# Patient Record
Sex: MALE | Race: WHITE | NOT HISPANIC OR LATINO | URBAN - METROPOLITAN AREA
[De-identification: names, ages, dates, MRNs, and addresses within clinical notes are randomized per-mention and may not be internally consistent; named-entity substitution may affect disease eponyms.]

---

## 2020-07-25 ENCOUNTER — TELEPHONE ENCOUNTER (OUTPATIENT)
Dept: URBAN - METROPOLITAN AREA CLINIC 13 | Facility: CLINIC | Age: 55
End: 2020-07-25

## 2020-07-25 RX ORDER — OMEPRAZOLE 20 MG/1
TAKE 1 TABLET DAILY TABLET, DELAYED RELEASE ORAL
Refills: 0 | OUTPATIENT
End: 2014-06-25

## 2020-07-25 RX ORDER — METRONIDAZOLE 500 MG/1
TAKE 1 TABLET TWICE DAILY TABLET ORAL
Refills: 0 | OUTPATIENT
End: 2014-06-25

## 2020-07-25 RX ORDER — OXYCODONE AND ACETAMINOPHEN 5; 325 MG/1; MG/1
TAKE 1 TABLET EVERY 4 HOURS AS NEEDED TABLET ORAL
Refills: 0 | OUTPATIENT
End: 2014-06-25

## 2020-07-25 RX ORDER — CLARITHROMYCIN 500 MG/1
TAKE 1 TABLET TWICE DAILY TABLET, FILM COATED ORAL
Refills: 0 | OUTPATIENT
End: 2014-06-25

## 2020-07-25 RX ORDER — LANSOPRAZOLE 30 MG/1
TAKE 1 CAPSULE DAILY CAPSULE, DELAYED RELEASE ORAL
Refills: 0 | OUTPATIENT
End: 2014-06-25

## 2020-07-25 RX ORDER — FAMOTIDINE 20 MG/1
TAKE 1 TABLET DAILY AS DIRECTED TABLET ORAL
Refills: 0 | OUTPATIENT
End: 2014-06-25

## 2020-07-26 ENCOUNTER — TELEPHONE ENCOUNTER (OUTPATIENT)
Dept: URBAN - METROPOLITAN AREA CLINIC 13 | Facility: CLINIC | Age: 55
End: 2020-07-26

## 2020-07-26 RX ORDER — CETIRIZINE HYDROCHLORIDE 10 MG/1
TAKE 1 TABLET DAILY AS NEEDED TABLET, FILM COATED ORAL
Refills: 0 | Status: ACTIVE | COMMUNITY

## 2020-07-26 RX ORDER — ESOMEPRAZOLE MAGNESIUM 40 MG
TAKE 1 CAPSULE DAILY CAPSULE,DELAYED RELEASE (ENTERIC COATED) ORAL
Qty: 30 | Refills: 1 | Status: ACTIVE | COMMUNITY
Start: 2014-06-25

## 2023-05-23 ENCOUNTER — LAB OUTSIDE AN ENCOUNTER (OUTPATIENT)
Dept: URBAN - METROPOLITAN AREA CLINIC 113 | Facility: CLINIC | Age: 58
End: 2023-05-23

## 2023-05-23 ENCOUNTER — WEB ENCOUNTER (OUTPATIENT)
Dept: URBAN - METROPOLITAN AREA CLINIC 113 | Facility: CLINIC | Age: 58
End: 2023-05-23

## 2023-05-23 ENCOUNTER — OFFICE VISIT (OUTPATIENT)
Dept: URBAN - METROPOLITAN AREA CLINIC 113 | Facility: CLINIC | Age: 58
End: 2023-05-23
Payer: COMMERCIAL

## 2023-05-23 ENCOUNTER — TELEPHONE ENCOUNTER (OUTPATIENT)
Dept: URBAN - METROPOLITAN AREA CLINIC 113 | Facility: CLINIC | Age: 58
End: 2023-05-23

## 2023-05-23 VITALS
HEART RATE: 55 BPM | DIASTOLIC BLOOD PRESSURE: 83 MMHG | BODY MASS INDEX: 25.79 KG/M2 | TEMPERATURE: 97.1 F | RESPIRATION RATE: 16 BRPM | WEIGHT: 170.2 LBS | SYSTOLIC BLOOD PRESSURE: 131 MMHG | HEIGHT: 68 IN

## 2023-05-23 DIAGNOSIS — K22.70 BARRETTS ESOPHAGUS: ICD-10-CM

## 2023-05-23 DIAGNOSIS — R63.4 WEIGHT LOSS: ICD-10-CM

## 2023-05-23 DIAGNOSIS — Z12.11 COLON CANCER SCREENING: ICD-10-CM

## 2023-05-23 DIAGNOSIS — Z86.010 COLON POLYP HISTORY (TUBULAR ADENOMA): ICD-10-CM

## 2023-05-23 DIAGNOSIS — R10.13 EPIGASTRIC ABDOMINAL PAIN: ICD-10-CM

## 2023-05-23 PROCEDURE — 99203 OFFICE O/P NEW LOW 30 MIN: CPT | Performed by: INTERNAL MEDICINE

## 2023-05-23 RX ORDER — AMLODIPINE BESYLATE 10 MG/1
1 TABLET TABLET ORAL ONCE A DAY
Status: ACTIVE | COMMUNITY

## 2023-05-23 RX ORDER — CETIRIZINE HYDROCHLORIDE 10 MG/1
TAKE 1 TABLET DAILY AS NEEDED TABLET, FILM COATED ORAL
Refills: 0 | Status: ACTIVE | COMMUNITY

## 2023-05-23 RX ORDER — TADALAFIL 5 MG/1
1 TABLET AS NEEDED TABLET, FILM COATED ORAL ONCE A DAY
Status: ACTIVE | COMMUNITY

## 2023-05-23 RX ORDER — ESOMEPRAZOLE MAGNESIUM 40 MG
TAKE 1 CAPSULE DAILY CAPSULE,DELAYED RELEASE (ENTERIC COATED) ORAL
Qty: 30 | Refills: 1 | Status: ACTIVE | COMMUNITY
Start: 2014-06-25

## 2023-05-23 NOTE — HPI-TODAY'S VISIT:
57-year-old man presents with a recent history of relatively acute epigastric pain which occurred after a day of bloating.  He had a couple of margaritas and some hot wings.  Then the next morning he had nausea and vomiting with epigastric discomfort.  Associate with diarrhea.  No rectal bleeding or melena.  No fever.  He went to urgent care and then to the emergency room at [facility].  There a CT scan was done which was negative.  Labs were essentially negative.  He was treated with antiacids and his symptoms slowly resolved.  However, after this he had inability to eat.  He had early satiety.  Nausea.  No vomiting.  No dysphagia.  No heartburn symptoms.  He does have heartburn from time to time and takes Nexium as needed.  He lost about 15 to 20 pounds.  Now his appetite is improving.  He has some constipation which was self-limited.  This is resolved.  No reports of hematemesis.  No further episodes of nausea or vomiting.  He is starting to feel better at this time. . He has a history of colon polyps.  He has been getting his colonoscopy to [facility].  We will obtain these records. . He has a history of an appendectomy in 2019 by Dr. Jacques at [place]. .

## 2023-05-23 NOTE — HPI-TODAY'S VISIT:
CT scan abdomen pelvis May 14, 2023.  No acute gastrointestinal findings.  Large prostate noted.  Nonobstructing left kidney stone. . Labs May 14, 2023.  Hemoglobin 16.3.  Platelet count 270,000.  Creatinine 0.6.  AST 42, ALT 28, alk phosphatase 47.  Hepatitis C antibody negative.  Lipase 46. . Abdominal ultrasound May 14, 2023.  Gallbladder sludge noted.  No evidence for acute cholecystitis.  No stones noted.  Common bile duct 3 mm. . CT scan July 2014.  Abdomen pelvis.  No acute gastrointestinal process identified. . EGD June 2014.  Irregular Z-line.  Biopsied.  Mild gastritis.  Normal duodenum.  Biopsies revealed Mitchell's mucosa.  No evidence for Helicobacter pylori. Dr. Callejas

## 2023-06-20 ENCOUNTER — CLAIMS CREATED FROM THE CLAIM WINDOW (OUTPATIENT)
Dept: URBAN - METROPOLITAN AREA SURGERY CENTER 25 | Facility: SURGERY CENTER | Age: 58
End: 2023-06-20

## 2023-06-20 ENCOUNTER — CLAIMS CREATED FROM THE CLAIM WINDOW (OUTPATIENT)
Dept: URBAN - METROPOLITAN AREA SURGERY CENTER 25 | Facility: SURGERY CENTER | Age: 58
End: 2023-06-20
Payer: COMMERCIAL

## 2023-06-20 ENCOUNTER — CLAIMS CREATED FROM THE CLAIM WINDOW (OUTPATIENT)
Dept: URBAN - METROPOLITAN AREA CLINIC 4 | Facility: CLINIC | Age: 58
End: 2023-06-20
Payer: COMMERCIAL

## 2023-06-20 DIAGNOSIS — K21.9 ESOPHAGEAL REFLUX: ICD-10-CM

## 2023-06-20 DIAGNOSIS — K20.80 ESOPHAGITIS, LOS ANGELES GRADE A: ICD-10-CM

## 2023-06-20 DIAGNOSIS — K22.89 PATULOUS LOWER ESOPHAGEAL SPHINCTER: ICD-10-CM

## 2023-06-20 DIAGNOSIS — K21.00 ESOPHAGITIS, REFLUX: ICD-10-CM

## 2023-06-20 DIAGNOSIS — K21.9 GASTRO-ESOPHAGEAL REFLUX DISEASE WITHOUT ESOPHAGITIS: ICD-10-CM

## 2023-06-20 PROCEDURE — 00731 ANES UPR GI NDSC PX NOS: CPT | Performed by: ANESTHESIOLOGY

## 2023-06-20 PROCEDURE — 88312 SPECIAL STAINS GROUP 1: CPT | Performed by: PATHOLOGY

## 2023-06-20 PROCEDURE — 88313 SPECIAL STAINS GROUP 2: CPT | Performed by: PATHOLOGY

## 2023-06-20 PROCEDURE — 00731 ANES UPR GI NDSC PX NOS: CPT | Performed by: ANESTHESIOLOGIST ASSISTANT

## 2023-06-20 PROCEDURE — G8907 PT DOC NO EVENTS ON DISCHARG: HCPCS | Performed by: INTERNAL MEDICINE

## 2023-06-20 PROCEDURE — 88305 TISSUE EXAM BY PATHOLOGIST: CPT | Performed by: PATHOLOGY

## 2023-06-20 PROCEDURE — 00811 ANES LWR INTST NDSC NOS: CPT | Performed by: ANESTHESIOLOGIST ASSISTANT

## 2023-06-20 PROCEDURE — 43239 EGD BIOPSY SINGLE/MULTIPLE: CPT | Performed by: INTERNAL MEDICINE

## 2023-06-20 RX ORDER — ESOMEPRAZOLE MAGNESIUM 40 MG
TAKE 1 CAPSULE DAILY CAPSULE,DELAYED RELEASE (ENTERIC COATED) ORAL
Qty: 30 | Refills: 1 | Status: ACTIVE | COMMUNITY
Start: 2014-06-25

## 2023-06-20 RX ORDER — TADALAFIL 5 MG/1
1 TABLET AS NEEDED TABLET, FILM COATED ORAL ONCE A DAY
Status: ACTIVE | COMMUNITY

## 2023-06-20 RX ORDER — AMLODIPINE BESYLATE 10 MG/1
1 TABLET TABLET ORAL ONCE A DAY
Status: ACTIVE | COMMUNITY

## 2023-06-20 RX ORDER — CETIRIZINE HYDROCHLORIDE 10 MG/1
TAKE 1 TABLET DAILY AS NEEDED TABLET, FILM COATED ORAL
Refills: 0 | Status: ACTIVE | COMMUNITY

## 2023-08-17 ENCOUNTER — OFFICE VISIT (OUTPATIENT)
Dept: URBAN - METROPOLITAN AREA CLINIC 113 | Facility: CLINIC | Age: 58
End: 2023-08-17
Payer: COMMERCIAL

## 2023-08-17 ENCOUNTER — LAB OUTSIDE AN ENCOUNTER (OUTPATIENT)
Dept: URBAN - METROPOLITAN AREA CLINIC 113 | Facility: CLINIC | Age: 58
End: 2023-08-17

## 2023-08-17 VITALS
SYSTOLIC BLOOD PRESSURE: 137 MMHG | WEIGHT: 179 LBS | BODY MASS INDEX: 27.13 KG/M2 | TEMPERATURE: 96.9 F | HEIGHT: 68 IN | DIASTOLIC BLOOD PRESSURE: 92 MMHG | RESPIRATION RATE: 18 BRPM | HEART RATE: 59 BPM

## 2023-08-17 DIAGNOSIS — R19.7 DIARRHEA: ICD-10-CM

## 2023-08-17 DIAGNOSIS — Z86.010 HISTORY OF COLON POLYPS: ICD-10-CM

## 2023-08-17 DIAGNOSIS — K20.80 ESOPHAGITIS, LOS ANGELES GRADE A: ICD-10-CM

## 2023-08-17 DIAGNOSIS — R10.13 EPIGASTRIC ABDOMINAL PAIN: ICD-10-CM

## 2023-08-17 PROCEDURE — 99214 OFFICE O/P EST MOD 30 MIN: CPT | Performed by: NURSE PRACTITIONER

## 2023-08-17 RX ORDER — HYDROCHLOROTHIAZIDE 25 MG/1
1 TABLET IN THE MORNING TABLET ORAL ONCE A DAY
Status: ACTIVE | COMMUNITY

## 2023-08-17 RX ORDER — CETIRIZINE HYDROCHLORIDE 10 MG/1
TAKE 1 TABLET DAILY AS NEEDED TABLET, FILM COATED ORAL
Refills: 0 | Status: ACTIVE | COMMUNITY

## 2023-08-17 RX ORDER — ESOMEPRAZOLE MAGNESIUM 40 MG
TAKE 1 CAPSULE DAILY CAPSULE,DELAYED RELEASE (ENTERIC COATED) ORAL
Qty: 30 | Refills: 1 | Status: ACTIVE | COMMUNITY
Start: 2014-06-25

## 2023-08-17 RX ORDER — AMLODIPINE BESYLATE 10 MG/1
1 TABLET TABLET ORAL ONCE A DAY
Status: ACTIVE | COMMUNITY

## 2023-08-17 RX ORDER — TADALAFIL 5 MG/1
1 TABLET AS NEEDED TABLET, FILM COATED ORAL ONCE A DAY
Status: ACTIVE | COMMUNITY

## 2023-08-17 NOTE — HPI-TODAY'S VISIT:
57-year-old male with a history of hypertension, GERD, presenting for follow-up after EGD. He was seen in the office in May for evaluation of epigastric pain, nausea, vomiting, and diarrhea, suspected to be related to acute gastroenteritis with some degree of postinfectious gastroparesis. His symptoms were improving at the time of the visit. Given chronic GERD, history of Mitchell's, and recent weight loss, an upper endoscopy was planned.  EGD 6/20/23: LA grade A reflux esophagitis limited to the GEJ, irregular z-line at 41cm, patulous lower esophageal sphincter. Pathology showed reflux-type changes, negative for Mitchell's.  About two weeks ago, he experienced a recurrence of upper abdominal pain, one episode of vomiting, and a few loose stools after being out on the boat. He questions if this may be related to dehydration or diet. His symptoms resolved over the course of a few days with bowel rest and he has been asymptomatic since. He does admit to being intolerant to both gluten and dairy, but continues to consume these foods with his family. His last colonoscopy was performed about 5 years ago notable for the removal of polyps.

## 2023-08-17 NOTE — HPI-OTHER HISTORIES
CT scan abdomen pelvis May 14, 2023.  No acute gastrointestinal findings.  Large prostate noted.  Nonobstructing left kidney stone. Labs May 14, 2023.  Hemoglobin 16.3.  Platelet count 270,000.  Creatinine 0.6.  AST 42, ALT 28, alk phosphatase 47.  Hepatitis C antibody negative.  Lipase 46. Abdominal ultrasound May 14, 2023.  Gallbladder sludge noted.  No evidence for acute cholecystitis.  No stones noted.  Common bile duct 3 mm. CT scan July 2014.  Abdomen pelvis.  No acute gastrointestinal process identified. EGD June 2014.  Irregular Z-line.  Biopsied.  Mild gastritis.  Normal duodenum.  Biopsies revealed Mitchell's mucosa.  No evidence for Helicobacter pylori. Dr. Callejas

## 2023-11-28 ENCOUNTER — OFFICE VISIT (OUTPATIENT)
Dept: URBAN - METROPOLITAN AREA CLINIC 113 | Facility: CLINIC | Age: 58
End: 2023-11-28
Payer: COMMERCIAL

## 2023-11-28 ENCOUNTER — LAB OUTSIDE AN ENCOUNTER (OUTPATIENT)
Dept: URBAN - METROPOLITAN AREA CLINIC 113 | Facility: CLINIC | Age: 58
End: 2023-11-28

## 2023-11-28 VITALS
HEART RATE: 62 BPM | WEIGHT: 192 LBS | HEIGHT: 68 IN | DIASTOLIC BLOOD PRESSURE: 92 MMHG | BODY MASS INDEX: 29.1 KG/M2 | TEMPERATURE: 98.2 F | SYSTOLIC BLOOD PRESSURE: 144 MMHG | RESPIRATION RATE: 16 BRPM

## 2023-11-28 DIAGNOSIS — K22.70 BARRETTS ESOPHAGUS: ICD-10-CM

## 2023-11-28 DIAGNOSIS — Z86.010 COLON POLYP HISTORY (TUBULAR ADENOMA): ICD-10-CM

## 2023-11-28 DIAGNOSIS — K21.00 GASTROESOPHAGEAL REFLUX DISEASE WITH ESOPHAGITIS WITHOUT HEMORRHAGE: ICD-10-CM

## 2023-11-28 PROCEDURE — 99213 OFFICE O/P EST LOW 20 MIN: CPT | Performed by: NURSE PRACTITIONER

## 2023-11-28 RX ORDER — AMLODIPINE BESYLATE 10 MG/1
1 TABLET TABLET ORAL ONCE A DAY
Status: ACTIVE | COMMUNITY

## 2023-11-28 RX ORDER — ESOMEPRAZOLE MAGNESIUM 20 MG/1
TAKE 1 CAPSULE DAILY CAPSULE, DELAYED RELEASE ORAL
OUTPATIENT
Start: 2014-06-25

## 2023-11-28 RX ORDER — CETIRIZINE HYDROCHLORIDE 10 MG/1
TAKE 1 TABLET DAILY AS NEEDED TABLET, FILM COATED ORAL
Refills: 0 | Status: ACTIVE | COMMUNITY

## 2023-11-28 RX ORDER — ESOMEPRAZOLE MAGNESIUM 20 MG/1
TAKE 1 CAPSULE DAILY CAPSULE, DELAYED RELEASE ORAL
Qty: 30 | Refills: 1 | Status: ACTIVE | COMMUNITY
Start: 2014-06-25

## 2023-11-28 RX ORDER — TADALAFIL 5 MG/1
1 TABLET AS NEEDED TABLET, FILM COATED ORAL ONCE A DAY
Status: ACTIVE | COMMUNITY

## 2023-11-28 RX ORDER — HYDROCHLOROTHIAZIDE 25 MG/1
1 TABLET IN THE MORNING TABLET ORAL ONCE A DAY
Status: ACTIVE | COMMUNITY

## 2023-11-28 NOTE — HPI-TODAY'S VISIT:
He was seen in the office in August for follow-up after EGD which revealed mild reflux esophagitis.  Biopsies were negative for Mitchell's.  His symptoms are managed with Nexium a few days per week.  Regarding his recent bout of self-limited abdominal pain, nausea with vomiting, and diarrhea, felt to be related to acute gastroenteritis, prior CT and RUQ ultrasound showed no acute process.  He elected for conservative management with plan to monitor his symptoms.  A colonoscopy was recommended for polyp surveillance. He continues to do well from a GI standpoint.  He has not experienced a recurrence of abdominal pain, vomiting, or diarrhea.  His GERD symptoms are managed with Nexium twice per week.  His bowel habits are regular without blood per rectum.  He is ready to schedule his colonoscopy.

## 2023-11-28 NOTE — HPI-OTHER HISTORIES
EGD 6/20/23: LA grade A reflux esophagitis limited to the GEJ, irregular z-line at 41cm, patulous lower esophageal sphincter. Pathology showed reflux-type changes, negative for Mitchell's. CT scan abdomen pelvis May 14, 2023.  No acute gastrointestinal findings.  Large prostate noted.  Nonobstructing left kidney stone. Labs May 14, 2023.  Hemoglobin 16.3.  Platelet count 270,000.  Creatinine 0.6.  AST 42, ALT 28, alk phosphatase 47.  Hepatitis C antibody negative.  Lipase 46. Abdominal ultrasound May 14, 2023.  Gallbladder sludge noted.  No evidence for acute cholecystitis.  No stones noted.  Common bile duct 3 mm. CT scan July 2014.  Abdomen pelvis.  No acute gastrointestinal process identified. EGD June 2014.  Irregular Z-line.  Biopsied.  Mild gastritis.  Normal duodenum.  Biopsies revealed Mitchell's mucosa.  No evidence for Helicobacter pylori. Dr. Callejas

## 2023-12-28 ENCOUNTER — OFFICE VISIT (OUTPATIENT)
Dept: URBAN - METROPOLITAN AREA SURGERY CENTER 25 | Facility: SURGERY CENTER | Age: 58
End: 2023-12-28
Payer: COMMERCIAL

## 2023-12-28 ENCOUNTER — DASHBOARD ENCOUNTERS (OUTPATIENT)
Age: 58
End: 2023-12-28

## 2023-12-28 ENCOUNTER — CLAIMS CREATED FROM THE CLAIM WINDOW (OUTPATIENT)
Dept: URBAN - METROPOLITAN AREA CLINIC 4 | Facility: CLINIC | Age: 58
End: 2023-12-28
Payer: COMMERCIAL

## 2023-12-28 DIAGNOSIS — D12.0 BENIGN NEOPLASM OF CECUM: ICD-10-CM

## 2023-12-28 DIAGNOSIS — Z86.010 PERSONAL HISTORY OF COLONIC POLYP: ICD-10-CM

## 2023-12-28 DIAGNOSIS — D12.3 ADENOMA OF TRANSVERSE COLON: ICD-10-CM

## 2023-12-28 DIAGNOSIS — K64.8 OTHER HEMORRHOIDS: ICD-10-CM

## 2023-12-28 DIAGNOSIS — D12.2 ADENOMA OF ASCENDING COLON: ICD-10-CM

## 2023-12-28 DIAGNOSIS — Z86.010 ADENOMAS PERSONAL HISTORY OF COLONIC POLYPS: ICD-10-CM

## 2023-12-28 DIAGNOSIS — K63.5 COLONIC POLYPS: ICD-10-CM

## 2023-12-28 DIAGNOSIS — D12.0 ADENOMA OF CECUM: ICD-10-CM

## 2023-12-28 PROCEDURE — G8907 PT DOC NO EVENTS ON DISCHARG: HCPCS | Performed by: INTERNAL MEDICINE

## 2023-12-28 PROCEDURE — 45385 COLONOSCOPY W/LESION REMOVAL: CPT | Performed by: INTERNAL MEDICINE

## 2023-12-28 PROCEDURE — 00811 ANES LWR INTST NDSC NOS: CPT | Performed by: ANESTHESIOLOGY

## 2023-12-28 PROCEDURE — 88305 TISSUE EXAM BY PATHOLOGIST: CPT | Performed by: PATHOLOGY

## 2023-12-28 PROCEDURE — 00811 ANES LWR INTST NDSC NOS: CPT | Performed by: ANESTHESIOLOGIST ASSISTANT

## 2023-12-28 RX ORDER — HYDROCHLOROTHIAZIDE 25 MG/1
1 TABLET IN THE MORNING TABLET ORAL ONCE A DAY
Status: ACTIVE | COMMUNITY

## 2023-12-28 RX ORDER — CETIRIZINE HYDROCHLORIDE 10 MG/1
TAKE 1 TABLET DAILY AS NEEDED TABLET, FILM COATED ORAL
Refills: 0 | Status: ACTIVE | COMMUNITY

## 2023-12-28 RX ORDER — ESOMEPRAZOLE MAGNESIUM 20 MG/1
TAKE 1 CAPSULE DAILY CAPSULE, DELAYED RELEASE ORAL
Status: ACTIVE | COMMUNITY
Start: 2014-06-25

## 2023-12-28 RX ORDER — TADALAFIL 5 MG/1
1 TABLET AS NEEDED TABLET, FILM COATED ORAL ONCE A DAY
Status: ACTIVE | COMMUNITY

## 2023-12-28 RX ORDER — AMLODIPINE BESYLATE 10 MG/1
1 TABLET TABLET ORAL ONCE A DAY
Status: ACTIVE | COMMUNITY